# Patient Record
Sex: FEMALE | Race: BLACK OR AFRICAN AMERICAN | Employment: FULL TIME | URBAN - METROPOLITAN AREA
[De-identification: names, ages, dates, MRNs, and addresses within clinical notes are randomized per-mention and may not be internally consistent; named-entity substitution may affect disease eponyms.]

---

## 2018-05-19 ENCOUNTER — HOSPITAL ENCOUNTER (EMERGENCY)
Age: 38
Discharge: HOME | End: 2018-05-19
Attending: EMERGENCY MEDICINE
Payer: SELF-PAY

## 2018-05-19 VITALS
SYSTOLIC BLOOD PRESSURE: 106 MMHG | BODY MASS INDEX: 22.2 KG/M2 | RESPIRATION RATE: 16 BRPM | WEIGHT: 130 LBS | HEIGHT: 64 IN | OXYGEN SATURATION: 100 % | DIASTOLIC BLOOD PRESSURE: 75 MMHG | TEMPERATURE: 102.2 F | HEART RATE: 96 BPM

## 2018-05-19 DIAGNOSIS — J01.00 ACUTE NON-RECURRENT MAXILLARY SINUSITIS: Primary | ICD-10-CM

## 2018-05-19 DIAGNOSIS — R50.9 FEVER, UNSPECIFIED FEVER CAUSE: ICD-10-CM

## 2018-05-19 PROCEDURE — 74011250637 HC RX REV CODE- 250/637: Performed by: EMERGENCY MEDICINE

## 2018-05-19 PROCEDURE — 99283 EMERGENCY DEPT VISIT LOW MDM: CPT

## 2018-05-19 RX ORDER — IBUPROFEN 600 MG/1
600 TABLET ORAL
Qty: 20 TAB | Refills: 0 | Status: SHIPPED | OUTPATIENT
Start: 2018-05-19 | End: 2018-11-18

## 2018-05-19 RX ORDER — IBUPROFEN 600 MG/1
600 TABLET ORAL
Status: COMPLETED | OUTPATIENT
Start: 2018-05-19 | End: 2018-05-19

## 2018-05-19 RX ORDER — OXYMETAZOLINE HCL 0.05 %
2 SPRAY, NON-AEROSOL (ML) NASAL 2 TIMES DAILY
Qty: 1 EACH | Refills: 0 | Status: SHIPPED | OUTPATIENT
Start: 2018-05-19 | End: 2018-05-22

## 2018-05-19 RX ORDER — ACETAMINOPHEN 325 MG/1
650 TABLET ORAL
Status: COMPLETED | OUTPATIENT
Start: 2018-05-19 | End: 2018-05-19

## 2018-05-19 RX ORDER — AMOXICILLIN 500 MG/1
500 TABLET, FILM COATED ORAL 3 TIMES DAILY
Qty: 30 TAB | Refills: 0 | Status: SHIPPED | OUTPATIENT
Start: 2018-05-19 | End: 2019-08-15

## 2018-05-19 RX ADMIN — IBUPROFEN 600 MG: 600 TABLET ORAL at 11:44

## 2018-05-19 RX ADMIN — ACETAMINOPHEN 650 MG: 325 TABLET ORAL at 10:47

## 2018-05-19 NOTE — ED TRIAGE NOTES
Patient complains of having a fever and nasal congestion x 3 days. Patient has taken thera flu without relief.

## 2018-05-19 NOTE — DISCHARGE INSTRUCTIONS
Learning About Fever  What is a fever? A fever is a high body temperature. It's one way your body fights being sick. A fever shows that the body is responding to infection or other illnesses, both minor and severe. A fever is a symptom, not an illness by itself. A fever can be a sign that you are ill, but most fevers are not caused by a serious problem. You may have a fever with a minor illness, such as a cold. But sometimes a very serious infection may cause little or no fever. It is important to look at other symptoms, other conditions you have, and how you feel in general. In children, notice how they act and see what symptoms they complain of. What is a normal body temperature? A normal body temperature is about 98. 6ºF. Some people have a normal temperature that is a little higher or a little lower than this. Your temperature may be a little lower in the morning than it is later in the day. It may go up during hot weather or when you exercise, wear heavy clothes, or take a hot bath. Your temperature may also be different depending on how you take it. A temperature taken in the mouth (oral) or under the arm may be a little lower than your core temperature (rectal). What is a fever temperature? A core temperature of 100.4°F or above is considered a fever. What can cause a fever? A fever may be caused by:  · Infections. This is the most common cause of a fever. Examples of infections that can cause a fever include the flu, a kidney infection, or pneumonia. · Some medicines. · Severe trauma or injury, such as a heart attack, stroke, heatstroke, or burns. · Other medical conditions, such as arthritis and some cancers. How can you treat a fever at home? · Ask your doctor if you can take an over-the-counter pain medicine, such as acetaminophen (Tylenol), ibuprofen (Advil, Motrin), or naproxen (Aleve). Be safe with medicines. Read and follow all instructions on the label.   · To prevent dehydration, drink plenty of fluids. Choose water and other caffeine-free clear liquids until you feel better. If you have kidney, heart, or liver disease and have to limit fluids, talk with your doctor before you increase the amount of fluids you drink. Follow-up care is a key part of your treatment and safety. Be sure to make and go to all appointments, and call your doctor if you are having problems. It's also a good idea to know your test results and keep a list of the medicines you take. Where can you learn more? Go to http://param-antoinette.info/. Enter J705 in the search box to learn more about \"Learning About Fever. \"  Current as of: March 20, 2017  Content Version: 11.4  © 2581-7384 [employer]. Care instructions adapted under license by [employer] (which disclaims liability or warranty for this information). If you have questions about a medical condition or this instruction, always ask your healthcare professional. [place] [address] any warranty or liability for your use of this information. Sinusitis: Care Instructions  Your Care Instructions    Sinusitis is an infection of the lining of the sinus cavities in your head. Sinusitis often follows a cold. It causes pain and pressure in your head and face. In most cases, sinusitis gets better on its own in 1 to 2 weeks. But some mild symptoms may last for several weeks. Sometimes antibiotics are needed. Follow-up care is a key part of your treatment and safety. Be sure to make and go to all appointments, and call your doctor if you are having problems. It's also a good idea to know your test results and keep a list of the medicines you take. How can you care for yourself at home? · Take an over-the-counter pain medicine, such as acetaminophen (Tylenol), ibuprofen (Advil, Motrin), or naproxen (Aleve). Read and follow all instructions on the label.   · If the doctor prescribed antibiotics, take them as directed. Do not stop taking them just because you feel better. You need to take the full course of antibiotics. · Be careful when taking over-the-counter cold or flu medicines and Tylenol at the same time. Many of these medicines have acetaminophen, which is Tylenol. Read the labels to make sure that you are not taking more than the recommended dose. Too much acetaminophen (Tylenol) can be harmful. · Breathe warm, moist air from a steamy shower, a hot bath, or a sink filled with hot water. Avoid cold, dry air. Using a humidifier in your home may help. Follow the directions for cleaning the machine. · Use saline (saltwater) nasal washes to help keep your nasal passages open and wash out mucus and bacteria. You can buy saline nose drops at a grocery store or drugstore. Or you can make your own at home by adding 1 teaspoon of salt and 1 teaspoon of baking soda to 2 cups of distilled water. If you make your own, fill a bulb syringe with the solution, insert the tip into your nostril, and squeeze gently. Verlee Leader your nose. · Put a hot, wet towel or a warm gel pack on your face 3 or 4 times a day for 5 to 10 minutes each time. · Try a decongestant nasal spray like oxymetazoline (Afrin). Do not use it for more than 3 days in a row. Using it for more than 3 days can make your congestion worse. When should you call for help? Call your doctor now or seek immediate medical care if:  ? · You have new or worse swelling or redness in your face or around your eyes. ? · You have a new or higher fever. ? Watch closely for changes in your health, and be sure to contact your doctor if:  ? · You have new or worse facial pain. ? · The mucus from your nose becomes thicker (like pus) or has new blood in it. ? · You are not getting better as expected. Where can you learn more? Go to http://param-antoinette.info/.   Enter O922 in the search box to learn more about \"Sinusitis: Care Instructions. \"  Current as of: May 12, 2017  Content Version: 11.4  © 4354-2345 [employer], [facility]. Care instructions adapted under license by [employer] (which disclaims liability or warranty for this information). If you have questions about a medical condition or this instruction, always ask your healthcare professional. [place] [address] any warranty or liability for your use of this information.

## 2018-05-19 NOTE — ED PROVIDER NOTES
EMERGENCY DEPARTMENT HISTORY AND PHYSICAL EXAM    11:29 AM      Date: 5/19/2018  Patient Name: Ananya Hannon    History of Presenting Illness     Chief Complaint   Patient presents with    Fever    Nasal Congestion         History Provided By: Patient    Chief Complaint: Fever   Duration:  2 days   Timing:  Constant  Location: Reports a right-sided headache  Quality: Aching  Severity: Mild  Modifying Factors: Reports that she has been taking her allergy medication. Notes that she has not been around others with a similar presentation. Associated Symptoms: Associated symptoms include nasal congestion, right sinus pressure, and a right-sided headache. Denies other associated symptoms, such as a cough. Additional History (Context): Ananya Hannon is a 41 y.o. female with No significant past medical history who presents with fever onset 2 days ago. Associated symptoms include nasal congestion, right sinus pressure, and a right-sided headache. Denies other associated symptoms, such as a cough. Reports that she has been taking her allergy medication. Notes that she has not been around others with a similar presentation. Reports to be a . Denies any concern for pregnancy. Patient denies EtOH use. Denies tobacco use. No other concerns were expressed at this time. PCP: None    Current Outpatient Prescriptions   Medication Sig Dispense Refill    ibuprofen (MOTRIN) 600 mg tablet Take 1 Tab by mouth every six (6) hours as needed for Pain. 20 Tab 0    amoxicillin 500 mg tab Take 500 mg by mouth three (3) times daily. 30 Tab 0    oxymetazoline (AFRIN, OXYMETAZOLINE,) 0.05 % nasal spray 2 Sprays by Both Nostrils route two (2) times a day for 3 days. 1 Each 0       Past History     Past Medical History:  No past medical history on file. Past Surgical History:  No past surgical history on file. Family History:  No family history on file.     Social History:  Social History   Substance Use Topics  Smoking status: Not on file    Smokeless tobacco: Not on file    Alcohol use Not on file       Allergies:  No Known Allergies      Review of Systems     Review of Systems   Constitutional: Positive for fever. Negative for activity change, fatigue and unexpected weight change. HENT: Positive for congestion and sinus pressure (right). Negative for rhinorrhea. Eyes: Negative for visual disturbance. Respiratory: Negative for shortness of breath. Cardiovascular: Negative for chest pain and palpitations. Gastrointestinal: Negative for abdominal pain, diarrhea, nausea and vomiting. Genitourinary: Negative for dysuria and hematuria. Musculoskeletal: Negative for back pain. Skin: Negative for rash. Neurological: Positive for headaches (right). Negative for dizziness, weakness and light-headedness. All other systems reviewed and are negative. Physical Exam     Visit Vitals    /75 (BP 1 Location: Left arm, BP Patient Position: At rest)    Pulse 96    Temp (!) 102.2 °F (39 °C)    Resp 16    Ht 5' 4\" (1.626 m)    Wt 59 kg (130 lb)    SpO2 100%    BMI 22.31 kg/m2     Physical Exam   Constitutional: She is oriented to person, place, and time. She appears well-developed and well-nourished. No distress. Non-toxic appearing      HENT:   Head: Normocephalic and atraumatic. Right Ear: External ear normal.   Left Ear: External ear normal.   Mouth/Throat: Oropharynx is clear and moist.   Tympanic membranes are dull bilaterally   Right maxillary sinus pressure on percussion   Yellow drainage from the right naris        Eyes: Conjunctivae and EOM are normal. Pupils are equal, round, and reactive to light. No scleral icterus. Neck: Normal range of motion. Neck supple. No JVD present. No tracheal deviation present. No thyromegaly present. Cardiovascular: Normal rate, regular rhythm, normal heart sounds and intact distal pulses. Exam reveals no gallop and no friction rub.     No murmur heard.  Pulmonary/Chest: Effort normal and breath sounds normal. She exhibits no tenderness. Abdominal: Soft. Bowel sounds are normal. She exhibits no distension. There is no tenderness. There is no rebound and no guarding. Musculoskeletal: Normal range of motion. She exhibits no edema or tenderness. Lymphadenopathy:     She has no cervical adenopathy. Neurological: She is alert and oriented to person, place, and time. No cranial nerve deficit. Coordination normal.   No sensory loss, Gait normal, Motor 5/5   Skin: Skin is warm and dry. Psychiatric: She has a normal mood and affect. Her behavior is normal. Judgment and thought content normal.   Nursing note and vitals reviewed. Diagnostic Study Results     Labs -  No results found for this or any previous visit (from the past 12 hour(s)). Radiologic Studies -   No orders to display         Medical Decision Making   I am the first provider for this patient. I reviewed the vital signs, available nursing notes, past medical history, past surgical history, family history and social history. Vital Signs-Reviewed the patient's vital signs. Pulse Oximetry Analysis -  100% on room air (Interpretation)    Records Reviewed: Nursing Notes (Time of Review: 11:29 AM)    ED Course: Progress Notes, Reevaluation, and Consults:    Provider Notes (Medical Decision Making): 61-year-old female with no significant PMHx presents with complaints of nasal congestion, right-sided headache, fever, and heavy yellow nasal drainage. Patient's symptoms are consistent with acute maxillary sinusitis, secondary to her allergies. Will start her on Pain control, amoxicillin, and refer her to a PCP, and was advised to return to the ED for any worsening symptoms or concerns. Diagnosis     Clinical Impression:   1. Acute non-recurrent maxillary sinusitis    2.  Fever, unspecified fever cause        Disposition: Discharged     Follow-up Information     Follow up With Details Comments Contact Info    [facility]  Schedule an appointment as soon as possible for a visit  Phone: [phone]     [facility] EMERGENCY DEPT Go to If symptoms worsen [address] [place] [id number]  [phone]           Patient's Medications   Start Taking    AMOXICILLIN 500 MG TAB    Take 500 mg by mouth three (3) times daily. IBUPROFEN (MOTRIN) 600 MG TABLET    Take 1 Tab by mouth every six (6) hours as needed for Pain. OXYMETAZOLINE (AFRIN, OXYMETAZOLINE,) 0.05 % NASAL SPRAY    2 Sprays by Both Nostrils route two (2) times a day for 3 days. Continue Taking    No medications on file   These Medications have changed    No medications on file   Stop Taking    IBUPROFEN (MOTRIN) 800 MG TABLET    Take 1 Tab by mouth every six (6) hours as needed for Pain.     _______________________________    Attestations:  Paige Reynolds [id number] acting as a scribe for and in the presence of Ye Holland MD      May 19, 2018 at 11:29 AM       Provider Attestation:      I personally performed the services described in the documentation, reviewed the documentation, as recorded by the scribe in my presence, and it accurately and completely records my words and actions.  May 19, 2018 at 11:29 AM - Ye Holland MD    _______________________________

## 2018-05-19 NOTE — LETTER
NOTIFICATION RETURN TO WORK / SCHOOL 
 
5/19/2018 11:34 AM 
 
Ms. Sophia Chang [facility] [address] [facility] [address] To Whom It May Concern: 
 
Sophia Chang is currently under the care of [facility] EMERGENCY DEPT. She will return to work on: 5/21/18. If there are questions or concerns please have the patient contact our office. Sincerely, 
 
 
 
Rehan Graf, DO 
ED Provider

## 2018-05-30 NOTE — ED NOTES
The [facility] contacted the client by phone to offer assistance them with connecting to the [facility]

## 2018-11-17 ENCOUNTER — HOSPITAL ENCOUNTER (EMERGENCY)
Age: 38
Discharge: HOME | End: 2018-11-18
Attending: EMERGENCY MEDICINE
Payer: SELF-PAY

## 2018-11-17 ENCOUNTER — APPOINTMENT (OUTPATIENT)
Dept: GENERAL RADIOLOGY | Age: 38
End: 2018-11-17
Attending: PHYSICIAN ASSISTANT
Payer: SELF-PAY

## 2018-11-17 VITALS
SYSTOLIC BLOOD PRESSURE: 102 MMHG | OXYGEN SATURATION: 100 % | HEART RATE: 60 BPM | RESPIRATION RATE: 14 BRPM | TEMPERATURE: 98.8 F | DIASTOLIC BLOOD PRESSURE: 65 MMHG

## 2018-11-17 DIAGNOSIS — M79.18 MUSCULOSKELETAL PAIN: Primary | ICD-10-CM
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
## 2018-11-17 PROCEDURE — 73030 X-RAY EXAM OF SHOULDER: CPT

## 2018-11-17 PROCEDURE — 99282 EMERGENCY DEPT VISIT SF MDM: CPT

## 2018-11-18 RX ORDER — HYDROCODONE BITARTRATE AND ACETAMINOPHEN 5; 325 MG/1; MG/1
1 TABLET ORAL
Qty: 15 TAB | Refills: 0 | Status: SHIPPED | OUTPATIENT
Start: 2018-11-18 | End: 2019-08-15

## 2018-11-18 RX ORDER — METHOCARBAMOL 500 MG/1
500 TABLET, FILM COATED ORAL 4 TIMES DAILY
Qty: 15 TAB | Refills: 0 | Status: SHIPPED | OUTPATIENT
Start: 2018-11-18 | End: 2019-08-15

## 2018-11-18 RX ORDER — IBUPROFEN 800 MG/1
800 TABLET ORAL
Qty: 20 TAB | Refills: 0 | Status: SHIPPED | OUTPATIENT
Start: 2018-11-18 | End: 2018-11-25
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
## 2019-08-15 ENCOUNTER — OFFICE VISIT (OUTPATIENT)
Dept: FAMILY MEDICINE CLINIC | Facility: CLINIC | Age: 39
End: 2019-08-15

## 2019-08-15 VITALS
HEIGHT: 64 IN | RESPIRATION RATE: 12 BRPM | SYSTOLIC BLOOD PRESSURE: 112 MMHG | WEIGHT: 155.2 LBS | BODY MASS INDEX: 26.5 KG/M2 | HEART RATE: 67 BPM | DIASTOLIC BLOOD PRESSURE: 62 MMHG | TEMPERATURE: 98.6 F | OXYGEN SATURATION: 100 %

## 2019-08-15 DIAGNOSIS — Z72.51 UNPROTECTED SEXUAL INTERCOURSE: Primary | ICD-10-CM

## 2019-08-15 DIAGNOSIS — Z13.6 SCREENING FOR CARDIOVASCULAR CONDITION: ICD-10-CM

## 2019-08-15 DIAGNOSIS — Z13.29 SCREENING FOR THYROID DISORDER: ICD-10-CM

## 2019-08-15 DIAGNOSIS — Z13.21 ENCOUNTER FOR VITAMIN DEFICIENCY SCREENING: ICD-10-CM

## 2019-08-15 DIAGNOSIS — B37.31 VAGINAL YEAST INFECTION: ICD-10-CM

## 2019-08-15 RX ORDER — FLUCONAZOLE 150 MG/1
150 TABLET ORAL
Qty: 2 TAB | Refills: 0 | Status: SHIPPED | OUTPATIENT
Start: 2019-08-15 | End: 2019-08-19

## 2019-08-15 NOTE — PROGRESS NOTES
Idalia Carr is a 42 y.o. female presenting today for Establish Care  . HPI:  Idalia Carr presents to the office today to establish care. Patient notes she has no significant past medical history and does not take any medications daily. Patient also has no surgical history. Patient presents today complaining of vaginal discharge. She denies any any suicidal order but admits to having unprotected intercourse. She also notes that she has not had STI testing for a while and would like to be tested. She presents today without any complaints of chest pain, palpitation or lower extremity edema. She denies any cough, wheezing or congestion. She is negative for abdominal pain, nausea or vomiting. Review of Systems   Respiratory: Negative for cough and sputum production. Cardiovascular: Negative for chest pain and palpitations. Gastrointestinal: Negative for abdominal pain, nausea and vomiting. Genitourinary: Negative for dysuria, frequency and urgency. Musculoskeletal: Negative for myalgias. Neurological: Negative for dizziness. No Known Allergies        History reviewed. No pertinent past medical history. History reviewed. No pertinent surgical history.     Social History     Socioeconomic History    Marital status: UNKNOWN     Spouse name: Not on file    Number of children: Not on file    Years of education: Not on file    Highest education level: Not on file   Occupational History    Not on file   Social Needs    Financial resource strain: Not on file    Food insecurity:     Worry: Not on file     Inability: Not on file    Transportation needs:     Medical: Not on file     Non-medical: Not on file   Tobacco Use    Smoking status: Never Smoker    Smokeless tobacco: Never Used   Substance and Sexual Activity    Alcohol use: Not on file    Drug use: Never    Sexual activity: Yes     Partners: Male     Birth control/protection: Condom   Lifestyle    Physical activity:     Days per week: Not on file     Minutes per session: Not on file    Stress: Not on file   Relationships    Social connections:     Talks on phone: Not on file     Gets together: Not on file     Attends [employer] service: Not on file     Active member of club or organization: Not on file     Attends meetings of clubs or organizations: Not on file     Relationship status: Not on file    Intimate partner violence:     Fear of current or ex partner: Not on file     Emotionally abused: Not on file     Physically abused: Not on file     Forced sexual activity: Not on file   Other Topics Concern    Not on file   Social History Narrative    Not on file       Patient does not have an advanced directive on file    Vitals:    08/15/19 1124   BP: 112/62   Pulse: 67   Resp: 12   Temp: 98.6 °F (37 °C)   TempSrc: Oral   SpO2: 100%   Weight: 155 lb 3.2 oz (70.4 kg)   Height: 5' 4\" (1.626 m)   PainSc:   0 - No pain       Physical Exam   Constitutional: No distress. Cardiovascular: Normal rate, regular rhythm and normal heart sounds. Pulmonary/Chest: Effort normal.   Neurological: She is alert. Nursing note and vitals reviewed. No visits with results within 3 Month(s) from this visit. Latest known visit with results is:   Hospital Outpatient Visit on 10/06/2010   Component Date Value Ref Range Status    MUMPS Ab, IgG 10/06/2010 >10.00  IV Final    Comment: (NOTE)                           REFERENCE INTERVAL: Mumps Ab, IgG by VANNESSA                                                       0.89 IV or less . .. Negative - No significant level of                                                detectable IgG mumps virus antibody                                                       0. 90-1.09 IV . ..... Equivocal - Repeat testing in 10-14                                                days may be helpful                                                       1. 10 IV or greater: Positive - IgG antibody to mumps virus detected, which may indicate a current                                                or past exposure/immunization to                                                mumps virus. Positive IgG antibody                                                levels in the absence of current                                                clinical symptoms may indicate                                                immunity. The best evidence for current infection is a significant                           change on two appropriately timed specimens, where both                           tests are done in the same laboratory at the same time. Performed by Sourav Mayfield , [id number] [facility] [phone]                           [email or web address], Bertha Bundy MD, Lab. Director                           [facility]    VZV Ab, IgG 10/06/2010 3.37  IV Final    Comment: (NOTE)                           REFERENCE INTERVAL: VZV Ab, IgG                                                       0.89 IV or less . ..... Negative - No significant level                                                    of detectable IgG varicella-                                                    zoster antibody. 0.90 - 1.09 IV . ....... Equivocal - Repeat testing in                                                    10-14 days may be helpful. 1. 10 IV or greater . ... Positive - IgG antibody to                                                    varicella-zoster detected, which                                                    may indicate a current or                                                    past varicella-zoster                                                    infection.  Positive IgG antibody levels in the absence                                                    of current clinical symptoms                                                    may indicate immunity (98%                                                    correlation with IFA). The best evidence for current infection is a significant                           change on two appropriately timed specimens, where both                           tests are done in the same laboratory at the same time. Performed by Sourav Mayfield , [id number] [facility] [phone]                           [email or web address], Bertha Graf MD, Lab. Director                           [facility]       . No results found for any visits on 08/15/19. Assessment / Plan:      ICD-10-CM ICD-9-CM    1. Unprotected sexual intercourse Z72.51 V69.2 HIV 1/2 AG/AB, 4TH GENERATION,W RFLX CONFIRM      T PALLIDUM AB      NUSWAB VAGINITIS + HSV      HSV 1/2 AB, IGG/IGM      HSV 1/2 AB, IGG/IGM      NUSWAB VAGINITIS + HSV      T PALLIDUM AB      HIV 1/2 AG/AB, 4TH GENERATION,W RFLX CONFIRM   2. Vaginal yeast infection B37.3 112.1 fluconazole (DIFLUCAN) 150 mg tablet   3. Screening for cardiovascular condition Z13.6 V81.2 CBC WITH AUTOMATED DIFF      LIPID PANEL      METABOLIC PANEL, COMPREHENSIVE   4. Encounter for vitamin deficiency screening Z13.21 V77.99 VITAMIN D, 25 HYDROXY   5. Screening for thyroid disorder Z13.29 V77.0 TSH 3RD GENERATION     Vaginal discharge-probable yeast.  Patient given Diflucan. Diflucan 150 mg times her dose  STD testing  Fasting labs ordered  Follow-up in 3 months  Follow-up and Dispositions    · Return in about 3 months (around 11/15/2019), or if symptoms worsen or fail to improve. I asked the patient if she  had any questions and answered her  questions.   The patient stated that she understands the treatment plan and agrees with the treatment plan    This document was created with a voice activated dictation system and may contain transcription errors.

## 2019-08-15 NOTE — PROGRESS NOTES
Leydi Gallo presents today for   Chief Complaint   Patient presents with   [address]       Is someone accompanying this pt? no    Is the patient using any DME equipment during OV? no    Depression Screening:  3 most recent PHQ Screens 8/15/2019   Little interest or pleasure in doing things Not at all   Feeling down, depressed, irritable, or hopeless Not at all   Total Score PHQ 2 0       Learning Assessment:  Learning Assessment 8/15/2019   PRIMARY LEARNER Patient   HIGHEST LEVEL OF EDUCATION - PRIMARY LEARNER  > 4 YEARS OF COLLEGE   BARRIERS PRIMARY LEARNER NONE   CO-LEARNER CAREGIVER No   PRIMARY LANGUAGE ENGLISH   LEARNER PREFERENCE PRIMARY PICTURES   ANSWERED BY patient   RELATIONSHIP SELF       Abuse Screening:  Abuse Screening Questionnaire 8/15/2019   Do you ever feel afraid of your partner? N   Are you in a relationship with someone who physically or mentally threatens you? N   Is it safe for you to go home? Y       Fall Risk  No flowsheet data found. Health Maintenance reviewed and discussed and ordered per Provider. Health Maintenance Due   Topic Date Due    DTaP/Tdap/Td series (1 - Tdap) 03/13/2003    PAP AKA CERVICAL CYTOLOGY  03/13/2003    Influenza Age 5 to Adult  08/01/2019           Coordination of Care:  1. Have you been to the ER, urgent care clinic since your last visit? Hospitalized since your last visit? no    2. Have you seen or consulted any other health care providers outside of the [address] [place] [address] since your last visit? Include any pap smears or colon screening.  no

## 2019-08-16 LAB
HIV -1/0/2 AG/AB WITH REFLEX, 13463: NON REACTIVE
HIV -1/0/2 AG/AB WITH REFLEX, 13463: NON REACTIVE
HIV 1 & 2 AB SER-IMP: NORMAL
HIV 1 & 2 AB SER-IMP: NORMAL
HSV 2 AB,IGG, HS2G: >8 AI
HSV1 IGG SER QL: 3 AI
SYPHILIS (T.PALLIDUM) IGG/IGM: NON REACTIVE

## 2019-08-20 LAB
HSV 1 IGM ANTIBODIES, 165181: NORMAL TITER
HSV 2 IGM ANTIBODIES, 165182: NORMAL TITER

## 2019-08-21 LAB
BACTERIAL VAGINOSIS, NAA: ABNORMAL
CANDIDA ALBICANS, NAA, 180056: NEGATIVE
CANDIDA GLABRATA, NAA, 180057: NEGATIVE
CANDIDA KRUSEI, NAA, 180016: NEGATIVE
HERPES 1 (THINPREP / APTIMA SWAB): NEGATIVE
HERPES 2 (THINPREP / APTIMA SWAB): NEGATIVE
TRICH VAG BY NAA, 180087: NEGATIVE

## 2019-08-28 DIAGNOSIS — N76.0 BV (BACTERIAL VAGINOSIS): Primary | ICD-10-CM

## 2019-08-28 DIAGNOSIS — B96.89 BV (BACTERIAL VAGINOSIS): Primary | ICD-10-CM

## 2019-08-28 RX ORDER — METRONIDAZOLE 500 MG/1
500 TABLET ORAL 2 TIMES DAILY
Qty: 14 TAB | Refills: 0 | Status: SHIPPED | OUTPATIENT
Start: 2019-08-28 | End: 2019-09-04

## 2019-08-28 NOTE — PROGRESS NOTES
Please let the patient know possible BV. Treated her with Flagyl. Prescription called to the pharmacy.

## 2019-10-30 LAB
25(OH)D3 SERPL-MCNC: 25 NG/ML (ref 32–100)
A-G RATIO,AGRAT: 1.3 RATIO (ref 1.1–2.6)
ABSOLUTE LYMPHOCYTE COUNT, 10803: 2 K/UL (ref 1–4.8)
ALBUMIN SERPL-MCNC: 4.2 G/DL (ref 3.5–5)
ALP SERPL-CCNC: 51 U/L (ref 25–115)
ALT SERPL-CCNC: 12 U/L (ref 5–40)
ANION GAP SERPL CALC-SCNC: 11 MMOL/L
AST SERPL W P-5'-P-CCNC: 17 U/L (ref 10–37)
BASOPHILS # BLD: 0 K/UL (ref 0–0.2)
BASOPHILS NFR BLD: 1 % (ref 0–2)
BILIRUB SERPL-MCNC: 0.3 MG/DL (ref 0.2–1.2)
BUN SERPL-MCNC: 8 MG/DL (ref 6–22)
CALCIUM SERPL-MCNC: 9.5 MG/DL (ref 8.4–10.5)
CHLORIDE SERPL-SCNC: 105 MMOL/L (ref 98–110)
CHOLEST SERPL-MCNC: 131 MG/DL (ref 110–200)
CO2 SERPL-SCNC: 27 MMOL/L (ref 20–32)
CREAT SERPL-MCNC: 0.6 MG/DL (ref 0.5–1.2)
EOSINOPHIL # BLD: 0.1 K/UL (ref 0–0.5)
EOSINOPHIL NFR BLD: 2 % (ref 0–6)
ERYTHROCYTE [DISTWIDTH] IN BLOOD BY AUTOMATED COUNT: 15.1 % (ref 10–15.5)
GFRAA, 66117: >60
GFRNA, 66118: >60
GLOBULIN,GLOB: 3.3 G/DL (ref 2–4)
GLUCOSE SERPL-MCNC: 84 MG/DL (ref 70–99)
GRANULOCYTES,GRANS: 43 % (ref 40–75)
HCT VFR BLD AUTO: 39.3 % (ref 35.1–46.5)
HDLC SERPL-MCNC: 2.8 MG/DL (ref 0–5)
HDLC SERPL-MCNC: 47 MG/DL (ref 40–59)
HGB BLD-MCNC: 11.2 G/DL (ref 11.7–15.5)
LDLC SERPL CALC-MCNC: 73 MG/DL (ref 50–99)
LYMPHOCYTES, LYMLT: 46 % (ref 20–45)
MCH RBC QN AUTO: 25 PG (ref 26–34)
MCHC RBC AUTO-ENTMCNC: 29 G/DL (ref 31–36)
MCV RBC AUTO: 86 FL (ref 80–95)
MONOCYTES # BLD: 0.4 K/UL (ref 0.1–1)
MONOCYTES NFR BLD: 8 % (ref 3–12)
NEUTROPHILS # BLD AUTO: 1.8 K/UL (ref 1.8–7.7)
PLATELET # BLD AUTO: 279 K/UL (ref 140–440)
PMV BLD AUTO: 11.4 FL (ref 9–13)
POTASSIUM SERPL-SCNC: 4.4 MMOL/L (ref 3.5–5.5)
PROT SERPL-MCNC: 7.5 G/DL (ref 6.4–8.3)
RBC # BLD AUTO: 4.58 M/UL (ref 3.8–5.2)
SODIUM SERPL-SCNC: 143 MMOL/L (ref 133–145)
TRIGL SERPL-MCNC: 58 MG/DL (ref 40–149)
TSH SERPL DL<=0.005 MIU/L-ACNC: 2.19 MCU/ML (ref 0.27–4.2)
VLDLC SERPL CALC-MCNC: 12 MG/DL (ref 8–30)
WBC # BLD AUTO: 4.2 K/UL (ref 4–11)

## 2019-11-06 ENCOUNTER — OFFICE VISIT (OUTPATIENT)
Dept: FAMILY MEDICINE CLINIC | Facility: CLINIC | Age: 39
End: 2019-11-06

## 2019-11-06 VITALS
RESPIRATION RATE: 12 BRPM | HEIGHT: 64 IN | WEIGHT: 160 LBS | HEART RATE: 66 BPM | BODY MASS INDEX: 27.31 KG/M2 | TEMPERATURE: 98 F | SYSTOLIC BLOOD PRESSURE: 123 MMHG | DIASTOLIC BLOOD PRESSURE: 69 MMHG | OXYGEN SATURATION: 98 %

## 2019-11-06 DIAGNOSIS — Z12.4 CERVICAL CANCER SCREENING: ICD-10-CM

## 2019-11-06 DIAGNOSIS — E55.9 VITAMIN D DEFICIENCY: Primary | ICD-10-CM

## 2019-11-06 NOTE — PROGRESS NOTES
Bruno Desouza is a 42 y.o. female presenting today for Labs (results) and Physical    HPI:  Bruno Desouza presents to the office today for complete physical examination. She had fasting labs prior to today's follow-up appointment. She feels well without any complaints. Her fasting labs showed mild vitamin D deficient she was instructed to take vitamin D over-the-counter. She also had STD testing which were negative. Her blood pressure is controlled to 123/69 she is negative for chest pain, palpitation or dyspnea. Review of Systems   Constitutional: Negative for malaise/fatigue. Respiratory: Negative for cough, sputum production and shortness of breath. Cardiovascular: Negative for chest pain, palpitations and leg swelling. Gastrointestinal: Negative for abdominal pain, nausea and vomiting. Musculoskeletal: Negative for myalgias. Neurological: Negative for dizziness and headaches. Psychiatric/Behavioral: Negative for depression. No Known Allergies        History reviewed. No pertinent past medical history. History reviewed. No pertinent surgical history.     Social History     Socioeconomic History    Marital status: UNKNOWN     Spouse name: Not on file    Number of children: Not on file    Years of education: Not on file    Highest education level: Not on file   Occupational History    Not on file   Social Needs    Financial resource strain: Not on file    Food insecurity:     Worry: Not on file     Inability: Not on file    Transportation needs:     Medical: Not on file     Non-medical: Not on file   Tobacco Use    Smoking status: Never Smoker    Smokeless tobacco: Never Used   Substance and Sexual Activity    Alcohol use: Not on file    Drug use: Never    Sexual activity: Yes     Partners: Male     Birth control/protection: Condom   Lifestyle    Physical activity:     Days per week: Not on file     Minutes per session: Not on file    Stress: Not on file   Relationships  Social connections:     Talks on phone: Not on file     Gets together: Not on file     Attends [employer] service: Not on file     Active member of club or organization: Not on file     Attends meetings of clubs or organizations: Not on file     Relationship status: Not on file    Intimate partner violence:     Fear of current or ex partner: Not on file     Emotionally abused: Not on file     Physically abused: Not on file     Forced sexual activity: Not on file   Other Topics Concern    Not on file   Social History Narrative    Not on file       Patient does not have an advanced directive on file    Vitals:    11/06/19 0917   BP: 123/69   Pulse: 66   Resp: 12   Temp: 98 °F (36.7 °C)   TempSrc: Oral   SpO2: 98%   Weight: 160 lb (72.6 kg)   Height: 5' 4\" (1.626 m)   PainSc:   0 - No pain   LMP: 10/17/2019       Physical Exam   Constitutional: She is oriented to person, place, and time. No distress. Cardiovascular: Normal rate, regular rhythm and normal heart sounds. Pulmonary/Chest: Effort normal and breath sounds normal.   Abdominal: Soft. Musculoskeletal: She exhibits no edema. Lymphadenopathy:     She has no cervical adenopathy. Neurological: She is alert and oriented to person, place, and time. Nursing note and vitals reviewed. Orders Only on 10/29/2019   Component Date Value Ref Range Status    Triglyceride 10/29/2019 58  40 - 149 mg/dL Final    HDL Cholesterol 10/29/2019 47  40 - 59 mg/dL Final    Cholesterol, total 10/29/2019 131  110 - 200 mg/dL Final    CHOLESTEROL/HDL 10/29/2019 2.8  0.0 - 5.0 Final    LDL, calculated 10/29/2019 73  50 - 99 mg/dL Final    VLDL, calculated 10/29/2019 12  8 - 30 mg/dL Final    Comment: Cholesterol Recommended NCEP guidelines in mg/dL:  Less than 200            Desirable  200 - 239                Borderline High  Greater than or  = 240   High  Please Note:  Total Chol/HDL Ratio                   Men     Women  1/2 Avg.  Risk    3.4     3.3      Avg. Risk    5.0     4.4  2X  Avg. Risk    9.6     7.1  3X  Avg. Risk   23.4    11.0      Glucose 10/29/2019 84  70 - 99 mg/dL Final    BUN 10/29/2019 8  6 - 22 mg/dL Final    Creatinine 10/29/2019 0.6  0.5 - 1.2 mg/dL Final    Sodium 10/29/2019 143  133 - 145 mmol/L Final    Potassium 10/29/2019 4.4  3.5 - 5.5 mmol/L Final    Chloride 10/29/2019 105  98 - 110 mmol/L Final    CO2 10/29/2019 27  20 - 32 mmol/L Final    AST (SGOT) 10/29/2019 17  10 - 37 U/L Final    ALT (SGPT) 10/29/2019 12  5 - 40 U/L Final    Alk. phosphatase 10/29/2019 51  25 - 115 U/L Final    Bilirubin, total 10/29/2019 0.3  0.2 - 1.2 mg/dL Final    Calcium 10/29/2019 9.5  8.4 - 10.5 mg/dL Final    Protein, total 10/29/2019 7.5  6.4 - 8.3 g/dL Final    Albumin 10/29/2019 4.2  3.5 - 5.0 g/dL Final    A-G Ratio 10/29/2019 1.3  1.1 - 2.6 ratio Final    Globulin 10/29/2019 3.3  2.0 - 4.0 g/dL Final    Anion gap 10/29/2019 11.0  mmol/L Final    Comment: Estimated GFR results are reported in mL/min/1.73 sq.m. by the MDRD equation. This eGFR is validated for stable chronic renal failure patients. This   equation  is unreliable in acute illness or patients with normal renal function.       GFRAA 10/29/2019 >60.0  >60.0 Final    GFRNA 10/29/2019 >60.0  >60.0 Final    VITAMIN D, 25-HYDROXY 10/29/2019 25.0* 32.0 - 100.0 ng/mL Final    TSH 10/29/2019 2.19  0.27 - 4.20 mcU/mL Final    WBC 10/29/2019 4.2  4.0 - 11.0 K/uL Final    RBC 10/29/2019 4.58  3.80 - 5.20 M/uL Final    HGB 10/29/2019 11.2* 11.7 - 15.5 g/dL Final    HCT 10/29/2019 39.3  35.1 - 46.5 % Final    MCV 10/29/2019 86  80 - 95 fL Final    MCH 10/29/2019 25* 26 - 34 pg Final    MCHC 10/29/2019 29* 31 - 36 g/dL Final    RDW 10/29/2019 15.1  10.0 - 15.5 % Final    PLATELET 77/87/8951 377  140 - 440 K/uL Final    MPV 10/29/2019 11.4  9.0 - 13.0 fL Final    NEUTROPHILS 10/29/2019 43  40 - 75 % Final    Lymphocytes 10/29/2019 46* 20 - 45 % Final    MONOCYTES 10/29/2019 8 3 - 12 % Final    EOSINOPHILS 10/29/2019 2  0 - 6 % Final    BASOPHILS 10/29/2019 1  0 - 2 % Final    ABS. NEUTROPHILS 10/29/2019 1.8  1.8 - 7.7 K/uL Final    ABSOLUTE LYMPHOCYTE COUNT 10/29/2019 2.0  1.0 - 4.8 K/uL Final    ABS. MONOCYTES 10/29/2019 0.4  0.1 - 1.0 K/uL Final    ABS. EOSINOPHILS 10/29/2019 0.1  0.0 - 0.5 K/uL Final    ABS. BASOPHILS 10/29/2019 0.0  0.0 - 0.2 K/uL Final   Orders Only on 08/15/2019   Component Date Value Ref Range Status    HSV 1 IgM Antibodies 08/15/2019 <1:10  <1:10 titer Final    HSV 2 IgM Antibodies 08/15/2019 <1:10  <1:10 titer Final    Comment: HSV 1 and HSV 2 share many cross-reacting antigens. Elevated titers  to both HSV 1 and HSV 2 may represent crossreactive HSV antibodies  rather than exposure to both HSV 1 and HSV 2. Results for this test are for research purposes only by the assay's  . The performance characteristics of this product have  not been established. Results should not be used as a diagnostic  procedure without confirmation of the diagnosis by another medically  established diagnostic product or procedure. Performed at:  [address]  [id number]  : David Inman MD, Phone:  [phone]      HSV-1 AB, IgG 08/15/2019 3.0* <0.9 AI Final    HSV-2 Ab, IgG 08/15/2019 >8.0* <0.9 AI Final    Comment: Reference Ranges for Herpes Simplex Type 1 IgG Abs  <0.9     AI   Negative   No HSV-1 antibodies detected. Patient is presumed                           not to have had a previous HSV-1 infection  0.9-1.0  AI   Equivocal  Equivocal result: obtaining an additional sample                           for re-testing is suggested  >=1.1    AI   Positive   IgG antibody to HSV-1 detected  Reference Ranges for Herpes Simplex Type 2 IgG Abs  <0.9     AI   Negative   No HSV-2 antibodies detected.  Patient is presumed                           not to have had a previous HSV-2 infection  0.9-1.0  AI Equivocal  Equivocal result: obtaining an additional sample                           for re-testing is suggested  >=1.1    AI   Positive   IgG antibody to HSV-2 detected      SYPHILIS (T.PALLIDUM) IGG/IGM 08/15/2019 Non Reactive  Non Reacti Final    Comment: [facility] uses a Reverse Syphilis Screening algorithm. The first test in the algorithm is a serum-based Syphilis Total (IgG/IgM). Positive Total (IgG/IgM) samples are automatically reflexed to a Rapid Plasma  Reagin (RPR) test. A RPR titer is performed on positive RPR samples. The algorithm stops if both the Total (IgG/IgM) and the RPR Screen are   positive. However, if the RPR result is non-reactive, the patient sample will be sent   out  for a T. pallidum particle agglutination (TP-PA) test.  Reverse Syphilis Screening Algorithm Interpretation  Syphilis    RPR Titer       TP-PA                   Interpretation  Total  (IgG/IgM)  Reactive    Reactive/Titer  Not performed  Presumptive evidence of current                                             infection (or inadequately treated                                             infection, persistent infection)  Reactive    Nonreactive     Reactive       Reactive- probable past infection   or                                                                        potential cross-reactivity with   other                                             spirochetes/related antigens                                             (yaws, pinta)                              Nonreactive    Negative - Probable false positive                                             results, however, past infection   with                                             syphilis cannot be ruled out. Repeat                                             testing suggested.   Equivocal   Nonreactive     Not performed  Suggest retesting in 2-4 weeks              Reactive/Titer  Not performed  Presumptive evidence of current infection (or inadequately treated                                             infection, persistent infection)  Nonreactive Not performed   Not performed  No serological evidence of   infections                                             with Treponema pallidum. Incubating                                                                        or early primary syphilis cannot be                                             excluded.  BACTERIAL VAGINOSIS, LUI 08/15/2019 Intermediate* Negative Final    C. albicans, LUI 08/15/2019 Negative  Negative Final    C. glabrata, LUI 08/15/2019 Negative  Negative Final    Aleena krusei 08/15/2019 Negative  Negative Final    T. vaginalis, LUI 08/15/2019 Negative  Negative Final    HERPES 1 (THINPREP / APTIMA SWAB) 08/15/2019 Negative  Negative Final    HERPES 2 (THINPREP / APTIMA SWAB) 08/15/2019 Negative  Negative Final    Comment: Bacterial Vaginosis  Interpretation:  Positive     -      Consistent with Bacterial Vaginosis  Negative     -      No evidence of Bacterial Vaginosis  Intermediate -      Consistent with transition from normal vaginal sanaz  Unspecified  -      Consistent with vaginal sanaz alteration unrelated to                      Bacterial Vaginosis  This test is intended for use to aid in the diagnosis of bacterial vaginosis  (BV) in women with clinical symptoms consistent with this condition. This test  is a Real-time PCR-based assay used to detect common vaginal pathogens  associated with vaginitis/vaginosis (Atopobium vaginae and Gardnerella  vaginalis) in correlation with the ratio of lactobacillus to total bacteria  present in the sample. BV is associated with susceptibility to sexually transmitted diseases,  premature labor and delivery, and low-birth-weight infants. A. vaginae has  recently been implicated in BV, however it is also known to be a member of the  normal sanaz.    Therefore, t he detection of A. vaginae alone has a poor  predictive value; however, the presence of A. vaginae at high levels is a good  predictor of BV. G. vaginalis is a well-known bacterium that has been  implicated in BV. Normal sanaz is dominated by a limited number of  Lactobacillus species which can be detected by our assay. A decrease in the  quantity of Lactobacillus species is one of the major characteristics   described  in BV. The coexistence of A. vaginae and G. vaginalis is a well-documented  characteristic of BV, whereas this association was only rarely present in  samples demonstrating normal sanaz. Literature has shown that patients with  bacterial vaginosis were more likely to test positive for Neisseria   gonorrhoeae  and Chlamydia  trachomatis. Therefore, a positive bacterial vaginosis result may warrant  additional testing when clinically indicated. A positive result indicates that species implicated in BV infection are   present  in a sufficiently high ratio in compariso                           n to total bacteria. Importantly,   this  assay assesses the level of lactobacillus to monitor shifts in normal sanaz  relative to the total bacteria present in the sample. A negative result  indicates that known BV causing organisms are absent or present at low  concentrations that did not alter normal sanaz levels. An intermediate call is  made when there is a transition from normal sanaz that has not reached the  level of BV infection. An unspecified call is made when there is a shift of  unknown significance. This can occur due to the presence of other organisms  not specifically tested for by this assay. Additional testing maybe warranted  in this case. An invalid call is made when the total bacteria level is  insufficient for analysis. Recollection is recommended if it is clinically  indicated.   This is not an FDA approved test. This assay/method meets or exceeds the   standards established by CLIA. Documentation is on file at  [facility]                           ar Lab. Candida  This test is a Real-time PCR-based assay used to detect DNA from common   candida  species (Candida albicans, Candida glabrata, and Candida krusei) from vaginal  Aptima swabs on symptomatic women. A detected result indicates that Candida species-specific DNA is present. A   not  detected result indicates that Candida species-specific DNA is either absent   or  is present at a concentration below the detection limit for that species. The  limit of detection determined by the [facility] laboratory  is 624 genomes per ml for C. albicans, 1654 genomes per ml C. glabrata, and 49  genomes per ml C. krusei. This is not an FDA-approved test. This assay/method meets or exceeds the   standards established by CLIA. Documentation is on file at  [facility]. HSV  Polymerase chain reaction (PCR) was performed for Herpes Simplex 1 and 2 using  primers for Herpes Simplex DNA polymerase gene. The                            Herpes  Simplex PCR assay included positive and negative control samples. This assay was developed and its performance characteristics were determined   by  the [facility] of [facility]. This is not an   FDA  approved test. This assay/method meets or exceeds the   standards established by CLIA. Documentation is on file at [facility]. SOURCE: Genital, vag      HIV -1/0/2 AG/AB WITH REFLEX 08/15/2019 Non Reactive  Non Reacti Final    HIV INTERPRETATION 08/15/2019 HIV-1 antigen and HIV 1/2 antibodies not detected. No laboratory evidence of   Final    Comment: HIV infection. If acute HIV infection is suspected, consider testing for   HIV-1  RNA by PCR.      Office Visit on 08/15/2019   Component Date Value Ref Range Status    HIV -1/0/2 AG/AB WITH REFLEX 08/15/2019 Non Reactive  Non Reacti Final    HIV INTERPRETATION 08/15/2019 HIV-1 antigen and HIV 1/2 antibodies not detected. No laboratory evidence of   Final    Comment: HIV infection. If acute HIV infection is suspected, consider testing for   HIV-1  RNA by PCR. Masha Jaffe No results found for any visits on 11/06/19. Assessment / Plan:      ICD-10-CM ICD-9-CM    1. Vitamin D deficiency E55.9 268.9    2. Cervical cancer screening Z12.4 V76.2 REFERRAL TO GYNECOLOGY     Repeat vitamin D in 6-month  Referral to GYN  Follow-up and Dispositions    · Return in about 6 months (around 5/6/2020), or if symptoms worsen or fail to improve. She needs influenza vaccine but none available    I asked the patient if she  had any questions and answered her  questions. The patient stated that she understands the treatment plan and agrees with the treatment plan    This document was created with a voice activated dictation system and may contain transcription errors. Discussed the patient's BMI with her. The BMI follow up plan is as follows:     dietary management education, guidance, and counseling  encourage exercise  monitor weight  prescribed dietary intake    An After Visit Summary was printed and given to the patient.

## 2019-11-06 NOTE — PATIENT INSTRUCTIONS
Body Mass Index: Care Instructions  Your Care Instructions    Body mass index (BMI) can help you see if your weight is raising your risk for health problems. It uses a formula to compare how much you weigh with how tall you are. · A BMI lower than 18.5 is considered underweight. · A BMI between 18.5 and 24.9 is considered healthy. · A BMI between 25 and 29.9 is considered overweight. A BMI of 30 or higher is considered obese. If your BMI is in the normal range, it means that you have a lower risk for weight-related health problems. If your BMI is in the overweight or obese range, you may be at increased risk for weight-related health problems, such as high blood pressure, heart disease, stroke, arthritis or joint pain, and diabetes. If your BMI is in the underweight range, you may be at increased risk for health problems such as fatigue, lower protection (immunity) against illness, muscle loss, bone loss, hair loss, and hormone problems. BMI is just one measure of your risk for weight-related health problems. You may be at higher risk for health problems if you are not active, you eat an unhealthy diet, or you drink too much alcohol or use tobacco products. Follow-up care is a key part of your treatment and safety. Be sure to make and go to all appointments, and call your doctor if you are having problems. It's also a good idea to know your test results and keep a list of the medicines you take. How can you care for yourself at home? · Practice healthy eating habits. This includes eating plenty of fruits, vegetables, whole grains, lean protein, and low-fat dairy. · If your doctor recommends it, get more exercise. Walking is a good choice. Bit by bit, increase the amount you walk every day. Try for at least 30 minutes on most days of the week. · Do not smoke. Smoking can increase your risk for health problems. If you need help quitting, talk to your doctor about stop-smoking programs and medicines. These can increase your chances of quitting for good. · Limit alcohol to 2 drinks a day for men and 1 drink a day for women. Too much alcohol can cause health problems. If you have a BMI higher than 25  · Your doctor may do other tests to check your risk for weight-related health problems. This may include measuring the distance around your waist. A waist measurement of more than 40 inches in men or 35 inches in women can increase the risk of weight-related health problems. · Talk with your doctor about steps you can take to stay healthy or improve your health. You may need to make lifestyle changes to lose weight and stay healthy, such as changing your diet and getting regular exercise. If you have a BMI lower than 18.5  · Your doctor may do other tests to check your risk for health problems. · Talk with your doctor about steps you can take to stay healthy or improve your health. You may need to make lifestyle changes to gain or maintain weight and stay healthy, such as getting more healthy foods in your diet and doing exercises to build muscle. Where can you learn more? Go to http://param-antoinette.info/. Enter S176 in the search box to learn more about \"Body Mass Index: Care Instructions. \"  Current as of: October 13, 2016  Content Version: 11.4  © 1878-2739 [employer]. Care instructions adapted under license by [employer] (which disclaims liability or warranty for this information). If you have questions about a medical condition or this instruction, always ask your healthcare professional. [address] any warranty or liability for your use of this information.

## 2019-11-06 NOTE — PROGRESS NOTES
Visit Vitals  /69   Pulse 66   Temp 98 °F (36.7 °C) (Oral)   Resp 12   Ht 5' 4\" (1.626 m)   Wt 160 lb (72.6 kg)   LMP 10/17/2019 (Approximate)   SpO2 98%   BMI 27.46 kg/m²     Bimal Bank presents today for   Chief Complaint   Patient presents with    Labs     results    Physical       Is someone accompanying this pt? no    Is the patient using any DME equipment during OV? no    Depression Screening:  3 most recent PHQ Screens 11/6/2019   Little interest or pleasure in doing things Not at all   Feeling down, depressed, irritable, or hopeless Not at all   Total Score PHQ 2 0       Learning Assessment:  Learning Assessment 8/15/2019   PRIMARY LEARNER Patient   HIGHEST LEVEL OF EDUCATION - PRIMARY LEARNER  > 4 YEARS OF COLLEGE   BARRIERS PRIMARY LEARNER NONE   CO-LEARNER CAREGIVER No   PRIMARY LANGUAGE ENGLISH   LEARNER PREFERENCE PRIMARY PICTURES   ANSWERED BY patient   RELATIONSHIP SELF       Abuse Screening:  Abuse Screening Questionnaire 8/15/2019   Do you ever feel afraid of your partner? N   Are you in a relationship with someone who physically or mentally threatens you? N   Is it safe for you to go home? Y       Fall Risk  No flowsheet data found. Health Maintenance reviewed and discussed and ordered per Provider. Health Maintenance Due   Topic Date Due    DTaP/Tdap/Td series (1 - Tdap) 03/13/2003    PAP AKA CERVICAL CYTOLOGY  03/13/2003    Influenza Age 5 to Adult  08/01/2019           Coordination of Care:  1. Have you been to the ER, urgent care clinic since your last visit? Hospitalized since your last visit? no    2. Have you seen or consulted any other health care providers outside of the [address] [place] [address] since your last visit? Include any pap smears or colon screening.  no

## 2022-01-29 ENCOUNTER — HOSPITAL ENCOUNTER (EMERGENCY)
Age: 42
Discharge: HOME | End: 2022-01-29
Attending: STUDENT IN AN ORGANIZED HEALTH CARE EDUCATION/TRAINING PROGRAM

## 2022-01-29 VITALS
TEMPERATURE: 98.1 F | RESPIRATION RATE: 16 BRPM | DIASTOLIC BLOOD PRESSURE: 88 MMHG | HEART RATE: 82 BPM | OXYGEN SATURATION: 100 % | SYSTOLIC BLOOD PRESSURE: 135 MMHG

## 2022-01-29 DIAGNOSIS — R11.2 NON-INTRACTABLE VOMITING WITH NAUSEA, UNSPECIFIED VOMITING TYPE: ICD-10-CM

## 2022-01-29 DIAGNOSIS — F12.90 MARIJUANA USE: Primary | ICD-10-CM

## 2022-01-29 PROCEDURE — 99282 EMERGENCY DEPT VISIT SF MDM: CPT

## 2022-01-29 RX ORDER — ONDANSETRON 4 MG/1
4 TABLET, ORALLY DISINTEGRATING ORAL
Status: DISCONTINUED | OUTPATIENT
Start: 2022-01-29 | End: 2022-01-29 | Stop reason: HOSPADM

## 2022-01-29 RX ORDER — ONDANSETRON 2 MG/ML
4 INJECTION INTRAMUSCULAR; INTRAVENOUS ONCE
Status: DISCONTINUED | OUTPATIENT
Start: 2022-01-29 | End: 2022-01-29 | Stop reason: HOSPADM

## 2022-01-29 NOTE — ED TRIAGE NOTES
Patient reports eating some \"edibles\" and now feels nauseated and \"sick\"  She has vomited twice since she is here even after PO Zofran.

## 2022-01-29 NOTE — Clinical Note
[address] [place] [address] Dr GAURAV GÓMEZ BEH [facility] EMERGENCY DEPT  [id number] [id number] [phone]    Work/School Note    Date: 1/29/2022    To Whom It May concern:      Olga Reddy was seen and treated today in the emergency room by the following provider(s):  Attending Provider: Sav Yusuf DO. Olga Reddy is excused from work/school on 01/29/22. She is clear to return to work/school on 01/30/22.         Sincerely,          Clayton Gonzales DO

## 2022-01-29 NOTE — ED PROVIDER NOTES
EMERGENCY DEPARTMENT HISTORY AND PHYSICAL EXAM      Date: (Not on file)  Patient Name: Cris Hdez    History of Presenting Illness     No chief complaint on file. History (Context): Cris Hdez is a 46 y.o. female with a past medical history significant for no significant past medical history comes into the ED today due to nausea and feelings of malaise. Patient states that approximately 2 hours ago she ate an edible containing marijuana. Since then she has felt uneasy, nauseous, and has had one episode of emesis. She states prior to eating edible she was feeling healthy and well without any symptoms. She states symptoms have worsened since onset. She denies taking any further medication for treatment of her symptoms prior to arrival.  She describes her symptoms as severe in quality. She denies any alleviating or exacerbating factors for her symptoms      PCP: Braeden Vilchis DNP    Current Facility-Administered Medications   Medication Dose Route Frequency Provider Last Rate Last Admin    ondansetron (ZOFRAN ODT) tablet 4 mg  4 mg Oral NOW Prseton Allison DO           Past History     Past Medical History:   None reported    Past Surgical History:  No past surgical history on file. Family History:  Family History   Problem Relation Age of Onset   [facility] Cancer Mother        Social History:   Social History     Tobacco Use    Smoking status: Never Smoker    Smokeless tobacco: Never Used   Substance Use Topics    Alcohol use: Not on file    Drug use: Never       Allergies:  No Known Allergies    PMH, PSH, family history, social history, allergies reviewed with the patient with significant items noted above. Review of Systems   Review of Systems   Constitutional: Positive for fatigue. Negative for chills and fever. HENT: Negative for sore throat. Eyes: Negative for visual disturbance. Respiratory: Negative for shortness of breath. Cardiovascular: Negative for chest pain. Gastrointestinal: Positive for vomiting. Negative for abdominal pain and nausea. Genitourinary: Negative for difficulty urinating. Musculoskeletal: Negative for myalgias. Skin: Negative for rash. Neurological: Negative for headaches. Physical Exam   There were no vitals filed for this visit. Physical Exam  Vitals and nursing note reviewed. Constitutional:       General: She is not in acute distress. Appearance: Normal appearance. She is not ill-appearing or toxic-appearing. HENT:      Head: Normocephalic and atraumatic. Mouth/Throat:      Mouth: Mucous membranes are moist.   Eyes:      General: No scleral icterus. Conjunctiva/sclera: Conjunctivae normal.   Cardiovascular:      Rate and Rhythm: Normal rate and regular rhythm. Comments: Normal peripheral perfusion  Pulmonary:      Effort: Pulmonary effort is normal. No respiratory distress. Abdominal:      General: There is no distension. Palpations: Abdomen is soft. Tenderness: There is no abdominal tenderness. Musculoskeletal:         General: No deformity. Normal range of motion. Cervical back: Normal range of motion and neck supple. Skin:     General: Skin is warm and dry. Findings: No rash. Neurological:      General: No focal deficit present. Mental Status: She is alert and oriented to person, place, and time. Mental status is at baseline. Comments: Slowed neuro response   Psychiatric:         Mood and Affect: Mood normal.         Thought Content: Thought content normal.         Diagnostic Study Results     Labs -   No results found for this or any previous visit (from the past 12 hour(s)). Labs Reviewed - No data to display    Radiologic Studies -   No orders to display     CT Results  (Last 48 hours)    None        CXR Results  (Last 48 hours)    None          The laboratory results, imaging results, and other diagnostic exams were reviewed in the EMR.     Medical Decision Making   I am the first provider for this patient. I reviewed the vital signs, available nursing notes, past medical history, past surgical history, family history and social history. Vital Signs-Reviewed the patient's vital signs. Records Reviewed: Personally, on initial evaluation    MDM:   Joseph Young presents with complaint of Vomiting and malaise  DDX includes but is not limited to: Marijuana side effect, emesis, medical screening exam    Patient overall well-appearing, no acute distress, nontoxic in appearance. Do not feel patient would benefit from lab work or imaging at this time based off of history and physical exam.  Will monitor patient while here in the emergency department provide her symptomatic treatment. Will continue to monitor and evaluate patient while in the ED. Orders as below:  Orders Placed This Encounter    ondansetron (ZOFRAN ODT) tablet 4 mg        ED Course:   ED Course as of 01/29/22 0552   Sat Jan 29, 2022   0214 Continuing to have emesis and feel nauseous. Will place order for further treatment for her symptoms. Will provide patient with medication for further treatment of her symptoms. [DV]   9007 Patient states improvement of her nausea but still feels \"out of it. \" Will continue to monitor patient. [DV]   [id number] Patient feeling better. Continues to appear well. Will discharge patient home with return precautions and follow-up recommendations. Patient verbalized understanding is without any further questions. [DV]      ED Course User Index  [DV] Donato Moeller DO           Procedures:  Procedures        Diagnosis and Disposition     CLINICAL IMPRESSION:  No diagnosis found. There are no discharge medications for this patient. Disposition: Home    Patient condition at time of disposition: Stable    DISCHARGE NOTE:   Pt has been reexamined. Patient has no new complaints, changes, or physical findings. Care plan outlined and precautions discussed.   Results were reviewed with the patient. All medications were reviewed with the patient. All of pt's questions and concerns were addressed. Alarm symptoms and return precautions associated with chief complaint and evaluation were reviewed with the patient in detail. The patient demonstrated adequate understanding. Patient was instructed to follow up with PCP, as well as strict return precautions to the ED upon further deterioration. Patient is ready to go home. The patient is happy with this plan          Dragon Disclaimer     Please note that this dictation was completed with [employer], the computer voice recognition software. Quite often unanticipated grammatical, syntax, homophones, and other interpretive errors are inadvertently transcribed by the computer software. Please disregard these errors. Please excuse any errors that have escaped final proofreading. Sari LITTLE.

## 2022-03-19 PROBLEM — E55.9 VITAMIN D DEFICIENCY: Status: ACTIVE | Noted: 2019-11-06

## 2025-03-16 SDOH — HEALTH STABILITY: PHYSICAL HEALTH: ON AVERAGE, HOW MANY DAYS PER WEEK DO YOU ENGAGE IN MODERATE TO STRENUOUS EXERCISE (LIKE A BRISK WALK)?: 5 DAYS

## 2025-03-16 SDOH — HEALTH STABILITY: PHYSICAL HEALTH: ON AVERAGE, HOW MANY MINUTES DO YOU ENGAGE IN EXERCISE AT THIS LEVEL?: 150+ MIN

## 2025-03-17 ENCOUNTER — HOSPITAL ENCOUNTER (OUTPATIENT)
Facility: HOSPITAL | Age: 45
Setting detail: SPECIMEN
Discharge: HOME | End: 2025-03-20
Payer: COMMERCIAL

## 2025-03-17 ENCOUNTER — RESULTS FOLLOW-UP (OUTPATIENT)
Facility: HOSPITAL | Age: 45
End: 2025-03-17

## 2025-03-17 ENCOUNTER — OFFICE VISIT (OUTPATIENT)
Facility: CLINIC | Age: 45
End: 2025-03-17
Payer: COMMERCIAL

## 2025-03-17 VITALS
RESPIRATION RATE: 12 BRPM | TEMPERATURE: 98.1 F | SYSTOLIC BLOOD PRESSURE: 133 MMHG | HEIGHT: 64 IN | WEIGHT: 165.4 LBS | HEART RATE: 67 BPM | OXYGEN SATURATION: 99 % | DIASTOLIC BLOOD PRESSURE: 76 MMHG | BODY MASS INDEX: 28.24 KG/M2

## 2025-03-17 DIAGNOSIS — Z13.6 SCREENING FOR CARDIOVASCULAR CONDITION: Primary | ICD-10-CM

## 2025-03-17 DIAGNOSIS — Z00.00 WELLNESS EXAMINATION: ICD-10-CM

## 2025-03-17 DIAGNOSIS — Z13.6 SCREENING FOR CARDIOVASCULAR CONDITION: ICD-10-CM

## 2025-03-17 DIAGNOSIS — R92.8 ABNORMAL MAMMOGRAM: ICD-10-CM

## 2025-03-17 LAB
ALBUMIN SERPL-MCNC: 3.9 G/DL (ref 3.4–5)
ALBUMIN/GLOB SERPL: 1.1 (ref 0.8–1.7)
ALP SERPL-CCNC: 46 U/L (ref 45–117)
ALT SERPL-CCNC: 24 U/L (ref 13–56)
ANION GAP SERPL CALC-SCNC: 7 MMOL/L (ref 3–18)
AST SERPL-CCNC: 19 U/L (ref 10–38)
BASOPHILS # BLD: 0.02 K/UL (ref 0–0.1)
BASOPHILS NFR BLD: 0.5 % (ref 0–2)
BILIRUB SERPL-MCNC: 0.4 MG/DL (ref 0.2–1)
BUN SERPL-MCNC: 10 MG/DL (ref 7–18)
BUN/CREAT SERPL: 15 (ref 12–20)
CALCIUM SERPL-MCNC: 9.1 MG/DL (ref 8.5–10.1)
CHLORIDE SERPL-SCNC: 108 MMOL/L (ref 100–111)
CHOLEST SERPL-MCNC: 131 MG/DL
CO2 SERPL-SCNC: 24 MMOL/L (ref 21–32)
CREAT SERPL-MCNC: 0.66 MG/DL (ref 0.6–1.3)
DIFFERENTIAL METHOD BLD: ABNORMAL
EOSINOPHIL # BLD: 0.07 K/UL (ref 0–0.4)
EOSINOPHIL NFR BLD: 1.8 % (ref 0–5)
ERYTHROCYTE [DISTWIDTH] IN BLOOD BY AUTOMATED COUNT: 14.3 % (ref 11.6–14.5)
EST. AVERAGE GLUCOSE BLD GHB EST-MCNC: 111 MG/DL
GLOBULIN SER CALC-MCNC: 3.7 G/DL (ref 2–4)
GLUCOSE SERPL-MCNC: 99 MG/DL (ref 74–99)
HBA1C MFR BLD: 5.5 % (ref 4.2–5.6)
HCT VFR BLD AUTO: 38.3 % (ref 35–45)
HDLC SERPL-MCNC: 35 MG/DL (ref 40–60)
HDLC SERPL: 3.7 (ref 0–5)
HGB BLD-MCNC: 11.7 G/DL (ref 12–16)
IMM GRANULOCYTES # BLD AUTO: 0.01 K/UL (ref 0–0.04)
IMM GRANULOCYTES NFR BLD AUTO: 0.3 % (ref 0–0.5)
LDLC SERPL CALC-MCNC: 75.2 MG/DL (ref 0–100)
LIPID PANEL: ABNORMAL
LYMPHOCYTES # BLD: 1.51 K/UL (ref 0.9–3.6)
LYMPHOCYTES NFR BLD: 37.9 % (ref 21–52)
MCH RBC QN AUTO: 24.4 PG (ref 24–34)
MCHC RBC AUTO-ENTMCNC: 30.5 G/DL (ref 31–37)
MCV RBC AUTO: 80 FL (ref 78–100)
MONOCYTES # BLD: 0.37 K/UL (ref 0.05–1.2)
MONOCYTES NFR BLD: 9.3 % (ref 3–10)
NEUTS SEG # BLD: 2 K/UL (ref 1.8–8)
NEUTS SEG NFR BLD: 50.2 % (ref 40–73)
NRBC # BLD: 0 K/UL (ref 0–0.01)
NRBC BLD-RTO: 0 PER 100 WBC
PLATELET # BLD AUTO: 314 K/UL (ref 135–420)
PMV BLD AUTO: 11.2 FL (ref 9.2–11.8)
POTASSIUM SERPL-SCNC: 4.2 MMOL/L (ref 3.5–5.5)
PROT SERPL-MCNC: 7.6 G/DL (ref 6.4–8.2)
RBC # BLD AUTO: 4.79 M/UL (ref 4.2–5.3)
SODIUM SERPL-SCNC: 139 MMOL/L (ref 136–145)
TRIGL SERPL-MCNC: 104 MG/DL
VLDLC SERPL CALC-MCNC: 20.8 MG/DL
WBC # BLD AUTO: 4 K/UL (ref 4.6–13.2)

## 2025-03-17 PROCEDURE — 83036 HEMOGLOBIN GLYCOSYLATED A1C: CPT

## 2025-03-17 PROCEDURE — 36415 COLL VENOUS BLD VENIPUNCTURE: CPT

## 2025-03-17 PROCEDURE — 80061 LIPID PANEL: CPT

## 2025-03-17 PROCEDURE — 99386 PREV VISIT NEW AGE 40-64: CPT | Performed by: STUDENT IN AN ORGANIZED HEALTH CARE EDUCATION/TRAINING PROGRAM

## 2025-03-17 PROCEDURE — 85025 COMPLETE CBC W/AUTO DIFF WBC: CPT

## 2025-03-17 PROCEDURE — 80053 COMPREHEN METABOLIC PANEL: CPT

## 2025-03-17 SDOH — ECONOMIC STABILITY: FOOD INSECURITY: WITHIN THE PAST 12 MONTHS, YOU WORRIED THAT YOUR FOOD WOULD RUN OUT BEFORE YOU GOT MONEY TO BUY MORE.: NEVER TRUE

## 2025-03-17 SDOH — ECONOMIC STABILITY: FOOD INSECURITY: WITHIN THE PAST 12 MONTHS, THE FOOD YOU BOUGHT JUST DIDN'T LAST AND YOU DIDN'T HAVE MONEY TO GET MORE.: NEVER TRUE

## 2025-03-17 ASSESSMENT — PATIENT HEALTH QUESTIONNAIRE - PHQ9
SUM OF ALL RESPONSES TO PHQ QUESTIONS 1-9: 0
SUM OF ALL RESPONSES TO PHQ QUESTIONS 1-9: 0
2. FEELING DOWN, DEPRESSED OR HOPELESS: NOT AT ALL
1. LITTLE INTEREST OR PLEASURE IN DOING THINGS: NOT AT ALL
SUM OF ALL RESPONSES TO PHQ QUESTIONS 1-9: 0
SUM OF ALL RESPONSES TO PHQ QUESTIONS 1-9: 0

## 2025-03-17 NOTE — PROGRESS NOTES
\"Have you been to the ER, urgent care clinic since your last visit?  Hospitalized since your last visit?\"    NO    “Have you seen or consulted any other health care providers outside our system since your last visit?”    NO     “Have you had a pap smear?”    NO; needing referral for GYN      No cervical cancer screening on file            
apologize in advance if the situation occurs.  If questions arise please do not hesitate to contact me or call our department.    An electronic signature was used to authenticate this note.    --Yulia Alegria MD

## 2025-03-26 ENCOUNTER — TELEPHONE (OUTPATIENT)
Facility: CLINIC | Age: 45
End: 2025-03-26

## 2025-03-26 NOTE — TELEPHONE ENCOUNTER
[facility] called requesting for the patient's ultrasound orders to be faxed over. She has an appointment on 03/28/2025. Her fax number is [phone]. Faxed over

## 2025-03-28 LAB — MAMMOGRAPHY, EXTERNAL: NORMAL

## 2025-04-28 ENCOUNTER — RESULTS FOLLOW-UP (OUTPATIENT)
Facility: CLINIC | Age: 45
End: 2025-04-28

## 2025-04-28 NOTE — RESULT ENCOUNTER NOTE
Called patient she states that she already had biopsy of axillary lymph node  and that it was normal      Dr Alegria

## 2025-05-19 ENCOUNTER — RESULTS FOLLOW-UP (OUTPATIENT)
Facility: CLINIC | Age: 45
End: 2025-05-19

## 2025-05-19 ENCOUNTER — HOSPITAL ENCOUNTER (OUTPATIENT)
Facility: HOSPITAL | Age: 45
Setting detail: SPECIMEN
Discharge: HOME | End: 2025-05-22
Payer: COMMERCIAL

## 2025-05-19 ENCOUNTER — OFFICE VISIT (OUTPATIENT)
Facility: CLINIC | Age: 45
End: 2025-05-19
Payer: COMMERCIAL

## 2025-05-19 VITALS
BODY MASS INDEX: 28.38 KG/M2 | SYSTOLIC BLOOD PRESSURE: 131 MMHG | HEIGHT: 64 IN | RESPIRATION RATE: 12 BRPM | OXYGEN SATURATION: 98 % | WEIGHT: 166.2 LBS | TEMPERATURE: 98 F | DIASTOLIC BLOOD PRESSURE: 78 MMHG | HEART RATE: 63 BPM

## 2025-05-19 DIAGNOSIS — D64.9 ANEMIA, UNSPECIFIED TYPE: ICD-10-CM

## 2025-05-19 DIAGNOSIS — D72.819 LEUKOPENIA, UNSPECIFIED TYPE: Primary | ICD-10-CM

## 2025-05-19 DIAGNOSIS — D50.9 IRON DEFICIENCY ANEMIA, UNSPECIFIED IRON DEFICIENCY ANEMIA TYPE: ICD-10-CM

## 2025-05-19 DIAGNOSIS — Z12.31 ENCOUNTER FOR SCREENING MAMMOGRAM FOR MALIGNANT NEOPLASM OF BREAST: Primary | ICD-10-CM

## 2025-05-19 LAB
BASOPHILS # BLD: 0.02 K/UL (ref 0–0.1)
BASOPHILS NFR BLD: 0.5 % (ref 0–2)
DIFFERENTIAL METHOD BLD: ABNORMAL
EOSINOPHIL # BLD: 0.04 K/UL (ref 0–0.4)
EOSINOPHIL NFR BLD: 1 % (ref 0–5)
ERYTHROCYTE [DISTWIDTH] IN BLOOD BY AUTOMATED COUNT: 14.3 % (ref 11.6–14.5)
FERRITIN SERPL-MCNC: 36 NG/ML (ref 13–400)
HCT VFR BLD AUTO: 37.6 % (ref 35–45)
HGB BLD-MCNC: 11.2 G/DL (ref 12–16)
IMM GRANULOCYTES # BLD AUTO: 0 K/UL (ref 0–0.04)
IMM GRANULOCYTES NFR BLD AUTO: 0 % (ref 0–0.5)
IRON SATN MFR SERPL: 17 %
IRON SERPL-MCNC: 52 UG/DL (ref 50–175)
LYMPHOCYTES # BLD: 1.7 K/UL (ref 0.9–3.6)
LYMPHOCYTES NFR BLD: 43.5 % (ref 21–52)
MCH RBC QN AUTO: 24 PG (ref 24–34)
MCHC RBC AUTO-ENTMCNC: 29.8 G/DL (ref 31–37)
MCV RBC AUTO: 80.5 FL (ref 78–100)
MONOCYTES # BLD: 0.44 K/UL (ref 0.05–1.2)
MONOCYTES NFR BLD: 11.3 % (ref 3–10)
NEUTS SEG # BLD: 1.71 K/UL (ref 1.8–8)
NEUTS SEG NFR BLD: 43.7 % (ref 40–73)
NRBC # BLD: 0 K/UL (ref 0–0.01)
NRBC BLD-RTO: 0 PER 100 WBC
PLATELET # BLD AUTO: 255 K/UL (ref 135–420)
PMV BLD AUTO: 11.5 FL (ref 9.2–11.8)
RBC # BLD AUTO: 4.67 M/UL (ref 4.2–5.3)
TIBC SERPL-MCNC: 314 UG/DL (ref 250–450)
UIBC SERPL-MCNC: 262 UG/DL (ref 112–347)
WBC # BLD AUTO: 3.9 K/UL (ref 4.6–13.2)

## 2025-05-19 PROCEDURE — 85025 COMPLETE CBC W/AUTO DIFF WBC: CPT

## 2025-05-19 PROCEDURE — 99213 OFFICE O/P EST LOW 20 MIN: CPT | Performed by: STUDENT IN AN ORGANIZED HEALTH CARE EDUCATION/TRAINING PROGRAM

## 2025-05-19 PROCEDURE — 87624 HPV HI-RISK TYP POOLED RSLT: CPT

## 2025-05-19 PROCEDURE — 83540 ASSAY OF IRON: CPT

## 2025-05-19 PROCEDURE — 82728 ASSAY OF FERRITIN: CPT

## 2025-05-19 PROCEDURE — 83550 IRON BINDING TEST: CPT

## 2025-05-19 PROCEDURE — 88175 CYTOPATH C/V AUTO FLUID REDO: CPT

## 2025-05-19 PROCEDURE — 36415 COLL VENOUS BLD VENIPUNCTURE: CPT

## 2025-05-19 NOTE — PROGRESS NOTES
Subjective:  Veronique is a 45 y.o. y.o. female who presents for following.       Here for pap smear  Last pap smear 2-3 years ago     ROS:   General: no fever  CV:  no chest pain  Resp: no shortness of breath  AB: no  abdominal pain     No past medical history on file.   Past Surgical History:   Procedure Laterality Date     SECTION  10/5/2011    Triplet Pregnancy    EYE SURGERY  2023    Eye Muscle Surgery    TUBAL LIGATION  10/5/2011      Social History     Socioeconomic History    Marital status: Single   Tobacco Use    Smoking status: Never    Smokeless tobacco: Never   Substance and Sexual Activity    Alcohol use: Never    Drug use: Never    Sexual activity: Yes     Partners: Male     Birth control/protection: Condom     Social Drivers of Health     Food Insecurity: No Food Insecurity (3/17/2025)    Hunger Vital Sign     Worried About Running Out of Food in the Last Year: Never true     Ran Out of Food in the Last Year: Never true   Transportation Needs: No Transportation Needs (3/17/2025)    PRAPARE - Transportation     Lack of Transportation (Medical): No     Lack of Transportation (Non-Medical): No   Physical Activity: Sufficiently Active (3/16/2025)    Exercise Vital Sign     Days of Exercise per Week: 5 days     Minutes of Exercise per Session: 150+ min   Housing Stability: Low Risk  (3/17/2025)    Housing Stability Vital Sign     Unable to Pay for Housing in the Last Year: No     Number of Times Moved in the Last Year: 0     Homeless in the Last Year: No      No current outpatient medications on file.     No current facility-administered medications for this visit.      No Known Allergies   Family History   Problem Relation Age of Onset    Cancer Mother     Mental Illness Mother             OBJECTIVE  Vitals:    25 0902   BP: 131/78   BP Site: Right Upper Arm   Patient Position: Sitting   BP Cuff Size: Medium Adult   Pulse: 63   Resp: 12   Temp: 98 °F (36.7 °C)   TempSrc: Temporal

## 2025-05-19 NOTE — PROGRESS NOTES
Have you been to the ER, urgent care clinic since your last visit?  Hospitalized since your last visit?   NO    Have you seen or consulted any other health care providers outside our system since your last visit?   NO     “Have you had a pap smear?”    YES - Where: with our office today    Nurse/CMA to request most recent records if not in the chart    No cervical cancer screening on file

## 2025-05-22 LAB — HPV I/H RISK 1 DNA CVX QL PROBE+SIG AMP: NEGATIVE
